# Patient Record
Sex: FEMALE | Race: WHITE
[De-identification: names, ages, dates, MRNs, and addresses within clinical notes are randomized per-mention and may not be internally consistent; named-entity substitution may affect disease eponyms.]

---

## 2019-11-26 ENCOUNTER — HOSPITAL ENCOUNTER (OUTPATIENT)
Dept: HOSPITAL 74 - FASU | Age: 59
Discharge: HOME | End: 2019-11-26
Attending: OPHTHALMOLOGY
Payer: COMMERCIAL

## 2019-11-26 VITALS — BODY MASS INDEX: 23.1 KG/M2

## 2019-11-26 VITALS — SYSTOLIC BLOOD PRESSURE: 120 MMHG | DIASTOLIC BLOOD PRESSURE: 54 MMHG | HEART RATE: 65 BPM

## 2019-11-26 VITALS — TEMPERATURE: 98.6 F

## 2019-11-26 DIAGNOSIS — H26.9: Primary | ICD-10-CM

## 2019-11-26 PROCEDURE — 08RK3JZ REPLACEMENT OF LEFT LENS WITH SYNTHETIC SUBSTITUTE, PERCUTANEOUS APPROACH: ICD-10-PCS | Performed by: OPHTHALMOLOGY

## 2019-11-26 RX ADMIN — PHENYLEPHRINE HYDROCHLORIDE SCH DROP: 0.25 SPRAY NASAL at 09:15

## 2019-11-26 RX ADMIN — OFLOXACIN SCH DROP: 3 SOLUTION/ DROPS OPHTHALMIC at 09:25

## 2019-11-26 RX ADMIN — PHENYLEPHRINE HYDROCHLORIDE SCH DROP: 0.25 SPRAY NASAL at 09:20

## 2019-11-26 RX ADMIN — OFLOXACIN SCH DROP: 3 SOLUTION/ DROPS OPHTHALMIC at 09:05

## 2019-11-26 RX ADMIN — CYCLOPENTOLATE HYDROCHLORIDE SCH DROP: 10 SOLUTION/ DROPS OPHTHALMIC at 09:20

## 2019-11-26 RX ADMIN — TROPICAMIDE SCH DROP: 10 SOLUTION/ DROPS OPHTHALMIC at 09:20

## 2019-11-26 RX ADMIN — TROPICAMIDE SCH DROP: 10 SOLUTION/ DROPS OPHTHALMIC at 09:10

## 2019-11-26 RX ADMIN — KETOROLAC TROMETHAMINE SCH DROP: 5 SOLUTION OPHTHALMIC at 09:20

## 2019-11-26 RX ADMIN — CYCLOPENTOLATE HYDROCHLORIDE SCH DROP: 10 SOLUTION/ DROPS OPHTHALMIC at 09:25

## 2019-11-26 RX ADMIN — TROPICAMIDE SCH DROP: 10 SOLUTION/ DROPS OPHTHALMIC at 09:25

## 2019-11-26 RX ADMIN — CYCLOPENTOLATE HYDROCHLORIDE SCH DROP: 10 SOLUTION/ DROPS OPHTHALMIC at 09:05

## 2019-11-26 RX ADMIN — PHENYLEPHRINE HYDROCHLORIDE SCH DROP: 0.25 SPRAY NASAL at 09:10

## 2019-11-26 RX ADMIN — PHENYLEPHRINE HYDROCHLORIDE SCH DROP: 0.25 SPRAY NASAL at 09:05

## 2019-11-26 RX ADMIN — KETOROLAC TROMETHAMINE SCH DROP: 5 SOLUTION OPHTHALMIC at 09:10

## 2019-11-26 RX ADMIN — KETOROLAC TROMETHAMINE SCH DROP: 5 SOLUTION OPHTHALMIC at 09:25

## 2019-11-26 RX ADMIN — OFLOXACIN SCH DROP: 3 SOLUTION/ DROPS OPHTHALMIC at 09:10

## 2019-11-26 RX ADMIN — TROPICAMIDE SCH DROP: 10 SOLUTION/ DROPS OPHTHALMIC at 09:15

## 2019-11-26 RX ADMIN — CYCLOPENTOLATE HYDROCHLORIDE SCH DROP: 10 SOLUTION/ DROPS OPHTHALMIC at 09:10

## 2019-11-26 RX ADMIN — KETOROLAC TROMETHAMINE SCH DROP: 5 SOLUTION OPHTHALMIC at 09:15

## 2019-11-26 RX ADMIN — TROPICAMIDE SCH DROP: 10 SOLUTION/ DROPS OPHTHALMIC at 09:05

## 2019-11-26 RX ADMIN — CYCLOPENTOLATE HYDROCHLORIDE SCH DROP: 10 SOLUTION/ DROPS OPHTHALMIC at 09:15

## 2019-11-26 RX ADMIN — OFLOXACIN SCH DROP: 3 SOLUTION/ DROPS OPHTHALMIC at 09:20

## 2019-11-26 RX ADMIN — KETOROLAC TROMETHAMINE SCH DROP: 5 SOLUTION OPHTHALMIC at 09:05

## 2019-11-26 RX ADMIN — PHENYLEPHRINE HYDROCHLORIDE SCH DROP: 0.25 SPRAY NASAL at 09:25

## 2019-11-26 RX ADMIN — OFLOXACIN SCH DROP: 3 SOLUTION/ DROPS OPHTHALMIC at 09:15

## 2019-11-26 NOTE — OP
DATE OF OPERATION:  11/26/2019

 

PREOPERATIVE DIAGNOSIS:  Cataract, left eye.

 

POSTOPERATIVE DIAGNOSIS:  Cataract, left eye.

 

PROCEDURE:  Cataract extraction via phacoemulsification with insertion of posterior

chamber lens implant, left eye.

 

SURGEON:  Radames Stone MD 

 

ASSISTANT:  Kayley Calderón MD 

 

ANESTHESIA:  Topical with sedation.

 

ESTIMATED BLOOD LOSS:  Less than 1 mL.

 

COMPLICATIONS:  None.

 

SPECIMENS:  None.

 

DESCRIPTION OF PROCEDURE:  The patient was identified in the holding area.  After all

risks, benefits, and alternatives were explained to the patient, informed consent was

obtained.  The left eye was marked with a marking pen.  The patient then entered the

operating room on an eye stretcher.  After a formal time-out was performed, topical

tetracaine eye drops were instilled onto the left eye.  The left eye was then prepped

and draped in the usual sterile fashion.  An eyelid speculum was placed beneath the

eyelid of the left eye.  An inferotemporal paracentesis incision was created using a

15-degree blade.  Topical preservative-free epinephrine and preservative-free

lidocaine was then injected into the anterior chamber.  Viscoelastic was then

injected into the anterior chamber.  A 2.4-mm keratome blade was then used to make a

superotemporal incision.  A 360-degree continuous curvilinear capsulorrhexis was then

created using bent cystotome and Utrata forceps.  Hydrodissection was performed using

balanced saline solution on a cannula.  Phacoemulsification was introduced to

disassemble and remove the nucleus in its entirety.  Irrigation/aspiration was then

used to remove any remaining cortical material from the eye.  The capsular bag was

reformed using viscoelastic.  An Yash Model SN60WF with a power of 26.0 diopter

serial number [id number] was inspected and found to be defect free and injected into

the capsular bag.  Irrigation/aspiration was then used to remove any remaining

viscoelastic from the eye.  The anterior chamber was reformed using balanced saline

solution.  All wounds were hydrated with balanced saline solution and noted to be

watertight.  The anterior chamber was deep.  The lens was perfectly centered in the

capsular bag.  There was red reflex present, and the eye had adequate pressure. 

Topical antibiotic eyedrops and ointment were then administered to the left eye.  The

eyelid speculum was removed from the right eye.  The left eye was shielded.  The

patient tolerated the procedure well.  Left the operating room in stable condition to

follow up in the eye clinic tomorrow morning at 10 o'clock.

 

 

RADAMES STONE M.D.

 

[id number]

DD: 11/26/2019 11:12

DT: 11/26/2019 11:32

Job #:  [id number]

## 2020-01-28 ENCOUNTER — HOSPITAL ENCOUNTER (OUTPATIENT)
Dept: HOSPITAL 74 - FASU | Age: 60
Discharge: HOME | End: 2020-01-28
Attending: OPHTHALMOLOGY
Payer: COMMERCIAL

## 2020-01-28 VITALS — BODY MASS INDEX: 24 KG/M2

## 2020-01-28 VITALS — SYSTOLIC BLOOD PRESSURE: 113 MMHG | HEART RATE: 80 BPM | DIASTOLIC BLOOD PRESSURE: 58 MMHG

## 2020-01-28 VITALS — TEMPERATURE: 99 F

## 2020-01-28 DIAGNOSIS — H26.9: Primary | ICD-10-CM

## 2020-01-28 PROCEDURE — 08RJ3JZ REPLACEMENT OF RIGHT LENS WITH SYNTHETIC SUBSTITUTE, PERCUTANEOUS APPROACH: ICD-10-PCS | Performed by: OPHTHALMOLOGY

## 2020-01-28 RX ADMIN — CYCLOPENTOLATE HYDROCHLORIDE SCH DROP: 10 SOLUTION/ DROPS OPHTHALMIC at 09:35

## 2020-01-28 RX ADMIN — TROPICAMIDE SCH DROP: 10 SOLUTION/ DROPS OPHTHALMIC at 09:50

## 2020-01-28 RX ADMIN — OFLOXACIN SCH DROP: 3 SOLUTION/ DROPS OPHTHALMIC at 09:35

## 2020-01-28 RX ADMIN — OFLOXACIN SCH DROP: 3 SOLUTION/ DROPS OPHTHALMIC at 09:55

## 2020-01-28 RX ADMIN — TROPICAMIDE SCH DROP: 10 SOLUTION/ DROPS OPHTHALMIC at 09:35

## 2020-01-28 RX ADMIN — KETOROLAC TROMETHAMINE SCH DROP: 5 SOLUTION OPHTHALMIC at 09:40

## 2020-01-28 RX ADMIN — TROPICAMIDE SCH DROP: 10 SOLUTION/ DROPS OPHTHALMIC at 09:40

## 2020-01-28 RX ADMIN — OFLOXACIN SCH DROP: 3 SOLUTION/ DROPS OPHTHALMIC at 09:45

## 2020-01-28 RX ADMIN — PHENYLEPHRINE HYDROCHLORIDE SCH DROP: 0.25 SPRAY NASAL at 09:50

## 2020-01-28 RX ADMIN — KETOROLAC TROMETHAMINE SCH DROP: 5 SOLUTION OPHTHALMIC at 09:50

## 2020-01-28 RX ADMIN — OFLOXACIN SCH DROP: 3 SOLUTION/ DROPS OPHTHALMIC at 09:40

## 2020-01-28 RX ADMIN — TROPICAMIDE SCH DROP: 10 SOLUTION/ DROPS OPHTHALMIC at 09:55

## 2020-01-28 RX ADMIN — PHENYLEPHRINE HYDROCHLORIDE SCH DROP: 0.25 SPRAY NASAL at 09:55

## 2020-01-28 RX ADMIN — PHENYLEPHRINE HYDROCHLORIDE SCH DROP: 0.25 SPRAY NASAL at 09:45

## 2020-01-28 RX ADMIN — OFLOXACIN SCH DROP: 3 SOLUTION/ DROPS OPHTHALMIC at 09:50

## 2020-01-28 RX ADMIN — CYCLOPENTOLATE HYDROCHLORIDE SCH DROP: 10 SOLUTION/ DROPS OPHTHALMIC at 09:55

## 2020-01-28 RX ADMIN — CYCLOPENTOLATE HYDROCHLORIDE SCH DROP: 10 SOLUTION/ DROPS OPHTHALMIC at 09:45

## 2020-01-28 RX ADMIN — PHENYLEPHRINE HYDROCHLORIDE SCH DROP: 0.25 SPRAY NASAL at 09:40

## 2020-01-28 RX ADMIN — CYCLOPENTOLATE HYDROCHLORIDE SCH DROP: 10 SOLUTION/ DROPS OPHTHALMIC at 09:50

## 2020-01-28 RX ADMIN — CYCLOPENTOLATE HYDROCHLORIDE SCH DROP: 10 SOLUTION/ DROPS OPHTHALMIC at 09:40

## 2020-01-28 RX ADMIN — KETOROLAC TROMETHAMINE SCH DROP: 5 SOLUTION OPHTHALMIC at 09:55

## 2020-01-28 RX ADMIN — PHENYLEPHRINE HYDROCHLORIDE SCH DROP: 0.25 SPRAY NASAL at 09:35

## 2020-01-28 RX ADMIN — KETOROLAC TROMETHAMINE SCH DROP: 5 SOLUTION OPHTHALMIC at 09:45

## 2020-01-28 RX ADMIN — KETOROLAC TROMETHAMINE SCH DROP: 5 SOLUTION OPHTHALMIC at 09:35

## 2020-01-28 RX ADMIN — TROPICAMIDE SCH DROP: 10 SOLUTION/ DROPS OPHTHALMIC at 09:45

## 2020-01-28 NOTE — OP
DATE OF OPERATION:  01/28/2020

 

PREOPERATIVE DIAGNOSIS:  Cataract, right eye.

 

POSTOPERATIVE DIAGNOSIS:  Cataract, right eye.

 

PROCEDURE:  Cataract extraction via phacoemulsification with insertion of posterior

chamber lens implant, right eye.

 

SURGEON:  Radames Stone MD 

 

ASSISTANT:  Kayley Calderón MD 

 

ANESTHESIA:  Topical with sedation.

 

ESTIMATED BLOOD LOSS:  Less than 1 mL.

 

COMPLICATIONS:  None.

 

SPECIMENS:  None.

 

DESCRIPTION OF PROCEDURE:  Patient was identified in the holding area.  After all

risks, benefits, and alternatives were explained to the patient, informed consent was

obtained.  The right eye was marked with a marking pen.  The patient then entered the

operating room on an eye stretcher.  After a formal time-out was performed, topical

tetracaine eye drops were instilled onto the right eye.  The right eye was then

prepped and draped in the usual sterile fashion.  Eyelid speculum was placed beneath

the eyelid of the right eye.  A superotemporal paracentesis incision was created

using a 15-degree blade.  Topical preservative-free epinephrine and preservative-free

lidocaine were then injected into the anterior chamber.  Viscoelastic was then

injected into the anterior chamber.  A 2.4-mm keratome blade was then used to make an

infratemporal incision.  A 360-degree continuous curvilinear capsulorrhexis was then

created using bent cystotome and Utrata forceps.  Hydrodissection was performed using

balanced saline solution on a cannula.  Phacoemulsification was introduced to

disassemble and remove the nucleus in its entirety.  Irrigation/aspiration was then

used to remove any remaining cortical material from the eye.  The capsular bag was

reformed using viscoelastic.  An Yash Model SN60WF with a power of 25.5 diopter

serial number [id number] was inspected and found to be defect free and injected into

the capsular bag.  Irrigation/aspiration was then used to remove any remaining

viscoelastic from the eye.  The anterior chamber was reformed using balanced saline

solution.  Intracameral BSS was then used to reform the anterior chamber.  All wounds

were hydrated with balanced saline solution and noted to be watertight.  There was a

red reflex present.  The anterior chamber was deep.  The lens was perfectly centered

in the capsular bag, and the eye had an adequate pressure.  Topical antibiotic

eyedrops and ointment were then administered to the right eye.  The eyelid speculum

was removed from the right eye.  The right eye was shielded.  The patient tolerated

the procedure well.  Left the operating room in stable condition to follow up in the

eye clinic tomorrow morning at 10 o'clock.

 

 

RADAMES STONE M.D.

 

[id number]

DD: 01/28/2020 12:10

DT: 01/28/2020 16:59

Job #:  [id number]